# Patient Record
Sex: MALE | Race: WHITE | NOT HISPANIC OR LATINO | Employment: FULL TIME | URBAN - METROPOLITAN AREA
[De-identification: names, ages, dates, MRNs, and addresses within clinical notes are randomized per-mention and may not be internally consistent; named-entity substitution may affect disease eponyms.]

---

## 2017-07-24 ENCOUNTER — NON-PROVIDER VISIT (OUTPATIENT)
Dept: URGENT CARE | Facility: CLINIC | Age: 27
End: 2017-07-24

## 2017-07-24 DIAGNOSIS — Z11.1 PPD SCREENING TEST: ICD-10-CM

## 2017-07-24 PROCEDURE — 86580 TB INTRADERMAL TEST: CPT | Performed by: NURSE PRACTITIONER

## 2017-07-26 ENCOUNTER — NON-PROVIDER VISIT (OUTPATIENT)
Dept: URGENT CARE | Facility: CLINIC | Age: 27
End: 2017-07-26
Payer: COMMERCIAL

## 2017-07-26 LAB — TB WHEAL 3D P 5 TU DIAM: NORMAL MM

## 2018-02-07 ENCOUNTER — OFFICE VISIT (OUTPATIENT)
Dept: URGENT CARE | Facility: CLINIC | Age: 28
End: 2018-02-07
Payer: COMMERCIAL

## 2018-02-07 VITALS
SYSTOLIC BLOOD PRESSURE: 118 MMHG | BODY MASS INDEX: 27.5 KG/M2 | RESPIRATION RATE: 14 BRPM | WEIGHT: 203 LBS | DIASTOLIC BLOOD PRESSURE: 74 MMHG | HEART RATE: 68 BPM | TEMPERATURE: 98 F | HEIGHT: 72 IN | OXYGEN SATURATION: 98 %

## 2018-02-07 DIAGNOSIS — H16.201 KERATOCONJUNCTIVITIS OF RIGHT EYE: ICD-10-CM

## 2018-02-07 PROCEDURE — 99203 OFFICE O/P NEW LOW 30 MIN: CPT | Performed by: EMERGENCY MEDICINE

## 2018-02-07 RX ORDER — MOXIFLOXACIN 5 MG/ML
1 SOLUTION/ DROPS OPHTHALMIC 3 TIMES DAILY
Qty: 1 BOTTLE | Refills: 0 | Status: SHIPPED | OUTPATIENT
Start: 2018-02-07 | End: 2020-09-04

## 2018-02-07 RX ORDER — POLYMYXIN B SULFATE AND TRIMETHOPRIM 1; 10000 MG/ML; [USP'U]/ML
1 SOLUTION OPHTHALMIC EVERY 4 HOURS
Qty: 10 ML | Refills: 0 | Status: CANCELLED | OUTPATIENT
Start: 2018-02-07

## 2018-02-07 ASSESSMENT — ENCOUNTER SYMPTOMS
VOMITING: 0
BLURRED VISION: 0
NAUSEA: 0
SORE THROAT: 0
FOREIGN BODY SENSATION: 0
EYE REDNESS: 1
EYE DISCHARGE: 1
DOUBLE VISION: 0
HEADACHES: 0
EYE ITCHING: 1
PHOTOPHOBIA: 1
EYE PAIN: 0
FEVER: 0

## 2018-02-07 ASSESSMENT — VISUAL ACUITY
OS_CC: 20/25
OD_CC: 20/25

## 2018-02-07 NOTE — PROGRESS NOTES
Subjective:      Bharathi Rosen is a 27 y.o. male who presents with Eye Problem (r eye irritation x 1 day .)            Eye Problem    The right eye is affected. This is a new problem. The current episode started today. The problem has been unchanged. There was no injury mechanism. There is known exposure to pink eye. He does not wear contacts. Associated symptoms include an eye discharge, eye redness, itching and photophobia. Pertinent negatives include no blurred vision, double vision, fever, foreign body sensation, nausea, recent URI or vomiting. He has tried nothing for the symptoms.   Notes pet cat with apparent infection currently. Past medical history significant for herpes keratitis.    Review of Systems   Constitutional: Negative for fever.   HENT: Negative for congestion and sore throat.    Eyes: Positive for photophobia, discharge, redness and itching. Negative for blurred vision, double vision and pain.   Gastrointestinal: Negative for nausea and vomiting.   Skin: Negative for rash.   Neurological: Negative for headaches.     PMH:  has no past medical history on file.  MEDS:   Current Outpatient Prescriptions:   •  NON SPECIFIED, , Disp: , Rfl:   •  Multiple Vitamins-Minerals (MULTIVITAMIN PO), Take  by mouth every day.  , Disp: , Rfl:   •  Non Formulary Request, HTP 5 daily, Disp: , Rfl:   ALLERGIES: No Known Allergies  SURGHX:   Past Surgical History:   Procedure Laterality Date   • SEPTOPLASTY  12/27/2012    Performed by Mario Leblanc M.D. at SURGERY SAME DAY [facility] ORS   • TURBINOPLASTY  12/27/2012    Performed by Mario Leblanc M.D. at SURGERY SAME DAY [facility] ORS   • ANTROSTOMY  12/27/2012    Performed by Mario Leblanc M.D. at SURGERY SAME DAY [facility]S   • ETHMOIDECTOMY  12/27/2012    Performed by Mario Leblanc M.D. at SURGERY SAME DAY [facility]S   • OTHER      wisdom teeth extraction     SOCHX:  reports that he has been smoking.  He does not have any smokeless tobacco history on  file. He reports that he drinks alcohol. He reports that he uses drugs.  FH: family history is not on file.       Objective:     /74   Pulse 68   Temp 36.7 °C (98 °F)   Resp 14   Ht 1.829 m (6')   Wt 92.1 kg (203 lb)   SpO2 98%   BMI 27.53 kg/m²      Physical Exam   Constitutional: He is oriented to person, place, and time. He appears well-developed and well-nourished. He is cooperative.  Non-toxic appearance. He does not appear ill. No distress.   HENT:   Head: Normocephalic.   Nose: No rhinorrhea.   Mouth/Throat: Uvula is midline. No trismus in the jaw. No oropharyngeal exudate, posterior oropharyngeal edema or posterior oropharyngeal erythema.   Eyes: EOM and lids are normal. Pupils are equal, round, and reactive to light. Right conjunctiva is injected. Right conjunctiva has no hemorrhage. Left conjunctiva is not injected.   Slit lamp exam:       The right eye shows no corneal abrasion, no corneal flare, no corneal ulcer, no foreign body, no hypopyon and no fluorescein uptake.       Neck: Phonation normal. Neck supple.   Pulmonary/Chest: Effort normal.   Lymphadenopathy:        Head (right side): No preauricular adenopathy present.        Head (left side): No preauricular adenopathy present.   Neurological: He is alert and oriented to person, place, and time.   Skin: Skin is warm and dry. No rash noted.   Psychiatric: He has a normal mood and affect.          Pattern not typical for HSV dendrites, non-staining, not ulcerative appearing     Assessment/Plan:     1. Keratoconjunctivitis of right eye  Rx Vigamox  REF OPHTHALMOLOGY

## 2019-03-11 ENCOUNTER — HOSPITAL ENCOUNTER (OUTPATIENT)
Dept: LAB | Facility: MEDICAL CENTER | Age: 29
End: 2019-03-11
Attending: FAMILY MEDICINE
Payer: COMMERCIAL

## 2019-03-11 LAB
ALBUMIN SERPL BCP-MCNC: 4.4 G/DL (ref 3.2–4.9)
ALBUMIN/GLOB SERPL: 1.4 G/DL
ALP SERPL-CCNC: 48 U/L (ref 30–99)
ALT SERPL-CCNC: 29 U/L (ref 2–50)
ANION GAP SERPL CALC-SCNC: 5 MMOL/L (ref 0–11.9)
AST SERPL-CCNC: 23 U/L (ref 12–45)
BASOPHILS # BLD AUTO: 1.2 % (ref 0–1.8)
BASOPHILS # BLD: 0.04 K/UL (ref 0–0.12)
BILIRUB SERPL-MCNC: 0.8 MG/DL (ref 0.1–1.5)
BUN SERPL-MCNC: 14 MG/DL (ref 8–22)
CALCIUM SERPL-MCNC: 9.7 MG/DL (ref 8.5–10.5)
CHLORIDE SERPL-SCNC: 103 MMOL/L (ref 96–112)
CHOLEST SERPL-MCNC: 190 MG/DL (ref 100–199)
CO2 SERPL-SCNC: 28 MMOL/L (ref 20–33)
CREAT SERPL-MCNC: 1.03 MG/DL (ref 0.5–1.4)
EOSINOPHIL # BLD AUTO: 0.1 K/UL (ref 0–0.51)
EOSINOPHIL NFR BLD: 2.9 % (ref 0–6.9)
ERYTHROCYTE [DISTWIDTH] IN BLOOD BY AUTOMATED COUNT: 42.6 FL (ref 35.9–50)
FASTING STATUS PATIENT QL REPORTED: NORMAL
GLOBULIN SER CALC-MCNC: 3.1 G/DL (ref 1.9–3.5)
GLUCOSE SERPL-MCNC: 88 MG/DL (ref 65–99)
HCT VFR BLD AUTO: 49.1 % (ref 42–52)
HDLC SERPL-MCNC: 43 MG/DL
HGB BLD-MCNC: 16.6 G/DL (ref 14–18)
IMM GRANULOCYTES # BLD AUTO: 0.01 K/UL (ref 0–0.11)
IMM GRANULOCYTES NFR BLD AUTO: 0.3 % (ref 0–0.9)
LDLC SERPL CALC-MCNC: 124 MG/DL
LYMPHOCYTES # BLD AUTO: 1.55 K/UL (ref 1–4.8)
LYMPHOCYTES NFR BLD: 45.7 % (ref 22–41)
MCH RBC QN AUTO: 32.5 PG (ref 27–33)
MCHC RBC AUTO-ENTMCNC: 33.8 G/DL (ref 33.7–35.3)
MCV RBC AUTO: 96.3 FL (ref 81.4–97.8)
MONOCYTES # BLD AUTO: 0.26 K/UL (ref 0–0.85)
MONOCYTES NFR BLD AUTO: 7.7 % (ref 0–13.4)
NEUTROPHILS # BLD AUTO: 1.43 K/UL (ref 1.82–7.42)
NEUTROPHILS NFR BLD: 42.2 % (ref 44–72)
NRBC # BLD AUTO: 0 K/UL
NRBC BLD-RTO: 0 /100 WBC
PLATELET # BLD AUTO: 202 K/UL (ref 164–446)
PMV BLD AUTO: 10.3 FL (ref 9–12.9)
POTASSIUM SERPL-SCNC: 4 MMOL/L (ref 3.6–5.5)
PROT SERPL-MCNC: 7.5 G/DL (ref 6–8.2)
RBC # BLD AUTO: 5.1 M/UL (ref 4.7–6.1)
SODIUM SERPL-SCNC: 136 MMOL/L (ref 135–145)
TRIGL SERPL-MCNC: 113 MG/DL (ref 0–149)
WBC # BLD AUTO: 3.4 K/UL (ref 4.8–10.8)

## 2019-03-11 PROCEDURE — 80053 COMPREHEN METABOLIC PANEL: CPT

## 2019-03-11 PROCEDURE — 36415 COLL VENOUS BLD VENIPUNCTURE: CPT

## 2019-03-11 PROCEDURE — 87389 HIV-1 AG W/HIV-1&-2 AB AG IA: CPT

## 2019-03-11 PROCEDURE — 80061 LIPID PANEL: CPT

## 2019-03-11 PROCEDURE — 87591 N.GONORRHOEAE DNA AMP PROB: CPT

## 2019-03-11 PROCEDURE — 87491 CHLMYD TRACH DNA AMP PROBE: CPT

## 2019-03-11 PROCEDURE — 80074 ACUTE HEPATITIS PANEL: CPT

## 2019-03-11 PROCEDURE — 86780 TREPONEMA PALLIDUM: CPT

## 2019-03-11 PROCEDURE — 85025 COMPLETE CBC W/AUTO DIFF WBC: CPT

## 2019-03-12 LAB
C TRACH DNA SPEC QL NAA+PROBE: POSITIVE
HAV IGM SERPL QL IA: NEGATIVE
HBV CORE IGM SER QL: NEGATIVE
HBV SURFACE AG SER QL: NEGATIVE
HCV AB SER QL: NEGATIVE
HIV 1+2 AB+HIV1 P24 AG SERPL QL IA: NON REACTIVE
N GONORRHOEA DNA SPEC QL NAA+PROBE: NEGATIVE
SPECIMEN SOURCE: ABNORMAL
TREPONEMA PALLIDUM IGG+IGM AB [PRESENCE] IN SERUM OR PLASMA BY IMMUNOASSAY: NON REACTIVE

## 2019-10-01 ENCOUNTER — TELEPHONE (OUTPATIENT)
Dept: HEMATOLOGY ONCOLOGY | Facility: MEDICAL CENTER | Age: 29
End: 2019-10-01

## 2020-04-08 ENCOUNTER — TELEPHONE (OUTPATIENT)
Dept: HEMATOLOGY ONCOLOGY | Facility: MEDICAL CENTER | Age: 30
End: 2020-04-08

## 2020-04-08 NOTE — PROGRESS NOTES
"Non face to face prolonged services of clinical data and chart information reviewed for a total time of 30 performed on 4/8 for Bharathi Rosen    Reviewed were:    Referral    Previous encounters        Imaging all imaging including ultrasound studies, CT/tomography, MRI/PET including staging    Previous procedures including surgical and biopsy studies with pathologic analysis and interpretation        Notes   R89.8 (ICD-10-CM) - Other abnormal findings in specimens from other organs, systems and tissues   Z80.0 (ICD-10-CM) - Family history of pancreatic cancer                   Media all personal and family clinical data including germline DNA studies with interpretation    Miscellaneous reports    Patient summaries family history as documented by referring clinician        Counseling, testing information and materials prepared    \"I spent 30 minutes  from 0700 to 0730 reviewing past records, prior to visit pertaining to genetic risk.\"     Rudi Beltran M.D.    edited  "

## 2020-04-09 ENCOUNTER — OFFICE VISIT (OUTPATIENT)
Dept: HEMATOLOGY ONCOLOGY | Facility: MEDICAL CENTER | Age: 30
End: 2020-04-09
Payer: COMMERCIAL

## 2020-04-09 VITALS
DIASTOLIC BLOOD PRESSURE: 78 MMHG | WEIGHT: 214.18 LBS | RESPIRATION RATE: 14 BRPM | OXYGEN SATURATION: 98 % | HEIGHT: 74 IN | SYSTOLIC BLOOD PRESSURE: 110 MMHG | TEMPERATURE: 98.6 F | HEART RATE: 61 BPM | BODY MASS INDEX: 27.49 KG/M2

## 2020-04-09 DIAGNOSIS — Z15.09 MONOALLELIC MUTATION OF PMS2 GENE: ICD-10-CM

## 2020-04-09 PROCEDURE — 99202 OFFICE O/P NEW SF 15 MIN: CPT | Performed by: MEDICAL GENETICS

## 2020-04-09 ASSESSMENT — PAIN SCALES - GENERAL: PAINLEVEL: NO PAIN

## 2020-04-09 ASSESSMENT — FIBROSIS 4 INDEX: FIB4 SCORE: 0.72

## 2020-04-09 ASSESSMENT — PATIENT HEALTH QUESTIONNAIRE - PHQ9: CLINICAL INTERPRETATION OF PHQ2 SCORE: 0

## 2020-04-09 NOTE — PROGRESS NOTES
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  Current recommendations for patients with molecularly confirmed mismatch repair gene mutations include  1) baseline and q2-3 year colonoscopy with focus on non-polyposis disease  2) baseline and q2-3 year upper GI studies to assess esophageal, gastric and small bowel disease  3) urology consultation and surveillance for both increased prostate and renal disease risk  4) family studies (eg paternal aunts and cousins) to identify who is at risk and initiation of surveillance  5) this gentleman has a 1 in 2 risk of passing his variant to his offspring. Referral to preimplantation genetic diagnosis specialist (eg Dr Julito Lockwood locally) to review options before pregnancy begins  6) pancreatic disease evaluation and surveillance can be difficult. There are clinical trials that might prove useful for this patient including imaging of pancreatic structures (eg transesophageal ultrasound) that the patient might find useful.    All questions answered     A total of 20 minutes of face to face discussion took place with >50% of time spent in data gathering and management planning

## 2021-02-24 ENCOUNTER — OFFICE VISIT (OUTPATIENT)
Dept: URGENT CARE | Facility: CLINIC | Age: 31
End: 2021-02-24
Payer: COMMERCIAL

## 2021-02-24 VITALS
HEART RATE: 68 BPM | BODY MASS INDEX: 29.42 KG/M2 | RESPIRATION RATE: 16 BRPM | TEMPERATURE: 98.5 F | WEIGHT: 222 LBS | SYSTOLIC BLOOD PRESSURE: 102 MMHG | DIASTOLIC BLOOD PRESSURE: 60 MMHG | HEIGHT: 73 IN | OXYGEN SATURATION: 97 %

## 2021-02-24 DIAGNOSIS — B07.9 VIRAL WARTS, UNSPECIFIED TYPE: ICD-10-CM

## 2021-02-24 PROCEDURE — 17000 DESTRUCT PREMALG LESION: CPT | Performed by: FAMILY MEDICINE

## 2021-02-24 ASSESSMENT — FIBROSIS 4 INDEX: FIB4 SCORE: 0.78

## 2021-02-24 NOTE — PROGRESS NOTES
"Chief Complaint   Patient presents with   • Warts     LT middle finger, x 4 months           Subjective:      Chief Complaint   Patient presents with   • Warts     LT middle finger, x 4 months         HPI       C/o wart, left middle finger x 4 mth.   Denies pain or bleeding.                Social History     Tobacco Use   • Smoking status: Former Smoker   • Smokeless tobacco: Never Used   Substance Use Topics   • Alcohol use: Yes     Comment: OCC   • Drug use: Not Currently     Comment: marijuana             Current Outpatient Medications on File Prior to Visit   Medication Sig Dispense Refill   • Multiple Vitamins-Minerals (MULTIVITAMIN PO) Take  by mouth every day.         No current facility-administered medications on file prior to visit.         Past Medical History:   Diagnosis Date   • Patient denies medical problems                Review of Systems   Constitutional: Negative for fever.   Respiratory: Negative for cough.    Cardiovascular: Negative for chest pain.   All other systems reviewed and are negative.         Objective:     /60 (BP Location: Left arm, Patient Position: Sitting, BP Cuff Size: Adult long)   Pulse 68   Temp 36.9 °C (98.5 °F) (Temporal)   Resp 16   Ht 1.854 m (6' 1\")   Wt 101 kg (222 lb)   SpO2 97%     Physical Exam   Constitutional: pt is oriented to person, place, and time. Pt appears well-developed and well-nourished. No distress.   HENT:   Head: Normocephalic and atraumatic.   Eyes: Conjunctivae are normal.   Cardiovascular: Normal rate.     Pulmonary/Chest: Effort normal.    Musculoskeletal:        Left middle finger:   There is a raised, verrucous lesion on distal finger.        Neurological: pt is alert and oriented to person, place, and time.   Skin: Skin is warm. Pt is not diaphoretic. No erythema.   Nursing note and vitals reviewed.              Assessment/Plan:     1. Viral warts, unspecified type    Lesion frozen using liquid nitrogen    Wound care instructions " given    F/u 2 wks if not resolved

## 2022-12-26 ENCOUNTER — OFFICE VISIT (OUTPATIENT)
Dept: URGENT CARE | Facility: CLINIC | Age: 32
End: 2022-12-26
Payer: COMMERCIAL

## 2022-12-26 VITALS
HEIGHT: 73 IN | DIASTOLIC BLOOD PRESSURE: 70 MMHG | SYSTOLIC BLOOD PRESSURE: 104 MMHG | OXYGEN SATURATION: 98 % | TEMPERATURE: 98.1 F | BODY MASS INDEX: 28.49 KG/M2 | WEIGHT: 215 LBS | HEART RATE: 60 BPM | RESPIRATION RATE: 16 BRPM

## 2022-12-26 DIAGNOSIS — K12.0 APHTHOUS ULCER OF PHARYNX OR HYPOPHARYNX: ICD-10-CM

## 2022-12-26 LAB
INT CON NEG: NORMAL
INT CON POS: NORMAL
S PYO AG THROAT QL: NORMAL

## 2022-12-26 PROCEDURE — 87880 STREP A ASSAY W/OPTIC: CPT | Performed by: PHYSICIAN ASSISTANT

## 2022-12-26 PROCEDURE — 99213 OFFICE O/P EST LOW 20 MIN: CPT | Performed by: PHYSICIAN ASSISTANT

## 2022-12-26 RX ORDER — LIDOCAINE HYDROCHLORIDE 20 MG/ML
5 SOLUTION OROPHARYNGEAL
Qty: 100 ML | Refills: 0 | Status: SHIPPED | OUTPATIENT
Start: 2022-12-26 | End: 2022-12-31

## 2022-12-26 ASSESSMENT — ENCOUNTER SYMPTOMS
CHILLS: 0
NAUSEA: 0
DIARRHEA: 0
SORE THROAT: 1
DIZZINESS: 0
WHEEZING: 0
HEADACHES: 0
FEVER: 0
SHORTNESS OF BREATH: 0
MYALGIAS: 0
SINUS PAIN: 0
ABDOMINAL PAIN: 0
SPUTUM PRODUCTION: 0
VOMITING: 0
NECK PAIN: 0
DIAPHORESIS: 0
COUGH: 0

## 2022-12-26 NOTE — PROGRESS NOTES
"Subjective:     CHIEF COMPLAINT  Chief Complaint   Patient presents with    Pharyngitis     Sore throat x3 days       HPI  Bharathi Rosen is a very pleasant 32 y.o. male who presents to the clinic with sore throat and mild fatigue x3 days.  Pain has progressively worsened over the last 3 days.  Noticed a small ulceration in the back of his throat.  He has not been running a fever.  Denies any body aches or chills.  No cough or congestion.  No known ill contacts.    REVIEW OF SYSTEMS  Review of Systems   Constitutional:  Positive for malaise/fatigue. Negative for chills, diaphoresis and fever.   HENT:  Positive for sore throat. Negative for congestion, ear pain and sinus pain.    Respiratory:  Negative for cough, sputum production, shortness of breath and wheezing.    Gastrointestinal:  Negative for abdominal pain, diarrhea, nausea and vomiting.   Musculoskeletal:  Negative for myalgias and neck pain.   Neurological:  Negative for dizziness and headaches.   Endo/Heme/Allergies:  Negative for environmental allergies.     PAST MEDICAL HISTORY  There are no problems to display for this patient.      SURGICAL HISTORY  patient denies any surgical history    ALLERGIES  No Known Allergies    CURRENT MEDICATIONS  Home Medications       Reviewed by Milton Delgado P.A.-C. (Physician Assistant) on 12/26/22 at 1301  Med List Status: <None>     Medication Last Dose Status        Patient Kalia Taking any Medications                           SOCIAL HISTORY  Social History     Tobacco Use    Smoking status: Never    Smokeless tobacco: Never   Substance and Sexual Activity    Alcohol use: Not on file    Drug use: Not on file    Sexual activity: Not on file       FAMILY HISTORY  History reviewed. No pertinent family history.       Objective:     VITAL SIGNS: /70   Pulse 60   Temp 36.7 °C (98.1 °F) (Temporal)   Resp 16   Ht 1.854 m (6' 1\")   Wt 97.5 kg (215 lb)   SpO2 98%   BMI 28.37 kg/m²     PHYSICAL EXAM  Physical " Exam  Constitutional:       General: He is not in acute distress.     Appearance: Normal appearance. He is not ill-appearing, toxic-appearing or diaphoretic.   HENT:      Head: Normocephalic and atraumatic.      Right Ear: Tympanic membrane, ear canal and external ear normal.      Left Ear: Tympanic membrane, ear canal and external ear normal.      Nose: Nose normal. No congestion or rhinorrhea.      Mouth/Throat:      Mouth: Mucous membranes are moist.      Pharynx: Posterior oropharyngeal erythema present. No oropharyngeal exudate.      Comments: Posterior oropharynx erythematous.  Aphthous ulceration appreciated to the right tonsillar pillar.  2 small ulcerations adjacent to the uvula.  No edema or exudate.  Midline uvula without deviation.  Eyes:      Conjunctiva/sclera: Conjunctivae normal.   Cardiovascular:      Rate and Rhythm: Normal rate and regular rhythm.      Pulses: Normal pulses.      Heart sounds: Normal heart sounds.   Pulmonary:      Effort: Pulmonary effort is normal.      Breath sounds: Normal breath sounds. No wheezing.   Musculoskeletal:      Cervical back: Normal range of motion. No muscular tenderness.   Lymphadenopathy:      Cervical: No cervical adenopathy.   Skin:     General: Skin is warm and dry.      Capillary Refill: Capillary refill takes less than 2 seconds.   Neurological:      Mental Status: He is alert.   Psychiatric:         Mood and Affect: Mood normal.         Thought Content: Thought content normal.       Assessment/Plan:     1. Aphthous ulcer of pharynx or hypopharynx  - POCT Rapid Strep A  - lidocaine (XYLOCAINE) 2 % Solution; Take 5 mL by mouth every 3 hours as needed for Throat/Mouth Pain for up to 5 days.  Dispense: 100 mL; Refill: 0      MDM/Comments:    Rapid strep in clinic returned negative.  Findings consistent with viral pharyngitis with associated aphthous ulcerations.  Maintain adequate oral intake.  Tylenol/ibuprofen as needed for pain.  Viscous lidocaine as  directed.  Return for any persistence or worsening of symptoms.    Differential diagnosis, natural history, supportive care, and indications for immediate follow-up discussed. All questions answered. Patient agrees with the plan of care.    Follow-up as needed if symptoms worsen or fail to improve to PCP, Urgent care or Emergency Room.    I have personally reviewed prior external notes and test results pertinent to today's visit.  I have independently reviewed and interpreted all diagnostics ordered during this urgent care acute visit.   Discussed management options (risks,benefits, and alternatives to treatment). Pt expresses understanding and the treatment plan was agreed upon. Questions were encouraged and answered to pt's satisfaction.    Please note that this dictation was created using voice recognition software. I have made a reasonable attempt to correct obvious errors, but I expect that there are errors of grammar and possibly content that I did not discover before finalizing the note.

## 2024-05-31 ENCOUNTER — APPOINTMENT (OUTPATIENT)
Dept: RADIOLOGY | Facility: IMAGING CENTER | Age: 34
End: 2024-05-31
Attending: FAMILY MEDICINE
Payer: COMMERCIAL

## 2024-05-31 ENCOUNTER — OFFICE VISIT (OUTPATIENT)
Dept: URGENT CARE | Facility: CLINIC | Age: 34
End: 2024-05-31
Payer: COMMERCIAL

## 2024-05-31 VITALS
BODY MASS INDEX: 28.49 KG/M2 | OXYGEN SATURATION: 97 % | HEART RATE: 57 BPM | WEIGHT: 215 LBS | TEMPERATURE: 98 F | DIASTOLIC BLOOD PRESSURE: 76 MMHG | SYSTOLIC BLOOD PRESSURE: 118 MMHG | HEIGHT: 73 IN | RESPIRATION RATE: 16 BRPM

## 2024-05-31 DIAGNOSIS — S60.121A CONTUSION OF RIGHT INDEX FINGER WITH DAMAGE TO NAIL, INITIAL ENCOUNTER: ICD-10-CM

## 2024-05-31 DIAGNOSIS — S62.660A CLOSED NONDISPLACED FRACTURE OF DISTAL PHALANX OF RIGHT INDEX FINGER, INITIAL ENCOUNTER: ICD-10-CM

## 2024-05-31 PROCEDURE — 73140 X-RAY EXAM OF FINGER(S): CPT | Mod: TC,FY,RT | Performed by: FAMILY MEDICINE

## 2024-05-31 NOTE — PROGRESS NOTES
"Subjective     Don Marcus Rosen is a 33 y.o. male who presents with Finger Injury (X1 Week, right index finger swollen, painful to bend and bruised )    - This is a very pleasant 33 y.o. who has come to the walk-in clinic today for right pointer finger injury last week.  Was using an ax to break the door open and right index finger got jammed and a small wound.  It has been a week and it still hurts and has trouble bending it.  Did have a superficial wound that is slowly healing.  Says has had last tetanus booster within the past 10 years as he was [place] and got it updated.          ALLERGIES:  Cat hair extract     PMH:  Past Medical History:   Diagnosis Date    Patient denies medical problems         PSH:  Past Surgical History:   Procedure Laterality Date    SEPTOPLASTY  12/27/2012    Performed by Mario Leblanc M.D. at SURGERY SAME DAY [facility]    TURBINOPLASTY  12/27/2012    Performed by Mario Leblanc M.D. at SURGERY SAME DAY [facility]    ANTROSTOMY  12/27/2012    Performed by Mario Leblanc M.D. at SURGERY SAME DAY [facility]    ETHMOIDECTOMY  12/27/2012    Performed by Mario Leblanc M.D. at SURGERY SAME DAY [facility]    OTHER      wisdom teeth extraction       MEDS:    Current Outpatient Medications:     pantoprazole (PROTONIX) 40 MG Tablet Delayed Response, , Disp: , Rfl:     Multiple Vitamins-Minerals (MULTIVITAMIN PO), Take  by mouth every day.  , Disp: , Rfl:     ** I have documented what I find to be significant in regards to past medical, social, family and surgical history  in my HPI or under PMH/PSH/FH review section, otherwise it is noncontributory **           HPI    Review of Systems   Musculoskeletal:  Positive for joint pain.   All other systems reviewed and are negative.             Objective     /76   Pulse (!) 57   Temp 36.7 °C (98 °F) (Temporal)   Resp 16   Ht 1.854 m (6' 1\")   Wt 97.5 kg (215 lb)   SpO2 97%   BMI 28.37 kg/m²      Physical Exam  Vitals " and nursing note reviewed.   Constitutional:       General: He is not in acute distress.     Appearance: Normal appearance. He is well-developed.   HENT:      Head: Normocephalic.   Pulmonary:      Effort: Pulmonary effort is normal. No respiratory distress.   Musculoskeletal:        Hands:       Comments: Rt Index: Healing superficial laceration without any signs of infection.  There is small subungual hematoma.  Decreased range of motion due to pain and swelling.  Neurovascularly intact.   Neurological:      Mental Status: He is alert.      Motor: No abnormal muscle tone.   Psychiatric:         Mood and Affect: Mood normal.         Behavior: Behavior normal.                             Assessment & Plan     1. Contusion of right index finger with damage to nail, initial encounter  DX-FINGER(S) 2+ RIGHT    Referral to Orthopedics      2. Closed nondisplaced fracture of distal phalanx of right index finger, initial encounter  Referral to Orthopedics          - Dx, plan & d/c instructions discussed   - Rest, ice, splint/immobilize  - OTC Motrin and/or Tylenol as needed  - otho f/u       Follow up with your regular primary care providers office within a week to keep them updated and informed of this visit and for regular routine health maintenance check-ups. ER if not improving in 2-3 days or if feeling/getting worse. (If you do not have a primary care provider and need to schedule one you may call [facility] at [phone] to do this).        Patient left in stable condition       Today's radiology imaging personally reviewed by me today on day of visit and Radiology readings reviewed and discussed w/ patient today.      Discussed if any testing, labs or imaging studies are obtained outside of the [facility] facility, it is their responsibility to contact the Urgent Care and let us know that it was done and get us the results so adequate follow up can be initiated    Pertinent prior lab work and/or imaging studies in Epic  have been reviewed by me today on day of this visit and taken into account for my treatment and plan today    Pertinent PMH/PSH and/or chronic conditions and medications if any were reviewed today and taken into account for my treatment and plan today    Pertinent prior office visit notes in Epic have been reviewed by me today on day of this visit.    Please note that this dictation may have been created using voice recognition software, if so I have made every reasonable attempt to correct obvious errors, but I expect that there are errors of grammar and possibly content that I did not discover before finalizing the note.

## 2024-10-17 ENCOUNTER — OFFICE VISIT (OUTPATIENT)
Dept: URGENT CARE | Facility: CLINIC | Age: 34
End: 2024-10-17
Payer: COMMERCIAL

## 2024-10-17 VITALS
SYSTOLIC BLOOD PRESSURE: 120 MMHG | HEIGHT: 73 IN | DIASTOLIC BLOOD PRESSURE: 78 MMHG | OXYGEN SATURATION: 98 % | TEMPERATURE: 98.2 F | RESPIRATION RATE: 16 BRPM | HEART RATE: 60 BPM | BODY MASS INDEX: 28.49 KG/M2 | WEIGHT: 215 LBS

## 2024-10-17 DIAGNOSIS — B00.53: ICD-10-CM

## 2024-10-17 DIAGNOSIS — H44.001 INFECTION OF RIGHT EYE: ICD-10-CM

## 2024-10-17 PROCEDURE — 3074F SYST BP LT 130 MM HG: CPT | Performed by: FAMILY MEDICINE

## 2024-10-17 PROCEDURE — 99213 OFFICE O/P EST LOW 20 MIN: CPT | Performed by: FAMILY MEDICINE

## 2024-10-17 PROCEDURE — 3078F DIAST BP <80 MM HG: CPT | Performed by: FAMILY MEDICINE

## 2024-10-17 RX ORDER — VALACYCLOVIR HYDROCHLORIDE 1 G/1
1000 TABLET, FILM COATED ORAL 3 TIMES DAILY
Qty: 21 TABLET | Refills: 0 | Status: SHIPPED | OUTPATIENT
Start: 2024-10-17 | End: 2024-10-24

## 2024-10-17 RX ORDER — MOXIFLOXACIN 5 MG/ML
1 SOLUTION/ DROPS OPHTHALMIC 3 TIMES DAILY
Qty: 3 ML | Refills: 0 | Status: SHIPPED | OUTPATIENT
Start: 2024-10-17 | End: 2024-10-22

## 2024-10-17 ASSESSMENT — ENCOUNTER SYMPTOMS
EYE DISCHARGE: 1
EYE REDNESS: 1